# Patient Record
Sex: MALE | Race: WHITE
[De-identification: names, ages, dates, MRNs, and addresses within clinical notes are randomized per-mention and may not be internally consistent; named-entity substitution may affect disease eponyms.]

---

## 2018-04-14 ENCOUNTER — HOSPITAL ENCOUNTER (EMERGENCY)
Dept: HOSPITAL 95 - ER | Age: 28
LOS: 1 days | Discharge: HOME | End: 2018-04-15
Payer: COMMERCIAL

## 2018-04-14 VITALS — HEIGHT: 66 IN | WEIGHT: 164.99 LBS | BODY MASS INDEX: 26.52 KG/M2

## 2018-04-14 DIAGNOSIS — Z91.038: ICD-10-CM

## 2018-04-14 DIAGNOSIS — F17.210: ICD-10-CM

## 2018-04-14 DIAGNOSIS — K59.00: Primary | ICD-10-CM

## 2018-04-14 DIAGNOSIS — Z79.899: ICD-10-CM

## 2018-06-21 ENCOUNTER — HOSPITAL ENCOUNTER (OUTPATIENT)
Dept: HOSPITAL 95 - LAB SHORT | Age: 28
End: 2018-06-21
Attending: PSYCHIATRY & NEUROLOGY
Payer: COMMERCIAL

## 2018-06-21 DIAGNOSIS — Z51.81: Primary | ICD-10-CM

## 2018-06-21 DIAGNOSIS — Z79.899: ICD-10-CM

## 2018-06-21 PROCEDURE — G0480 DRUG TEST DEF 1-7 CLASSES: HCPCS

## 2019-04-01 ENCOUNTER — HOSPITAL ENCOUNTER (EMERGENCY)
Dept: HOSPITAL 95 - ER | Age: 29
Discharge: LEFT BEFORE DISCHARGE | End: 2019-04-01
Payer: COMMERCIAL

## 2019-04-01 VITALS — BODY MASS INDEX: 21.69 KG/M2 | WEIGHT: 134.99 LBS | HEIGHT: 66 IN

## 2019-04-01 DIAGNOSIS — L02.414: Primary | ICD-10-CM

## 2019-04-01 DIAGNOSIS — Z53.20: ICD-10-CM

## 2019-04-02 ENCOUNTER — HOSPITAL ENCOUNTER (EMERGENCY)
Dept: HOSPITAL 95 - ER | Age: 29
Discharge: LEFT BEFORE DISCHARGE | End: 2019-04-02
Payer: COMMERCIAL

## 2019-04-02 VITALS — HEIGHT: 64 IN | BODY MASS INDEX: 28.17 KG/M2 | WEIGHT: 164.99 LBS

## 2019-04-02 DIAGNOSIS — L02.414: Primary | ICD-10-CM

## 2019-04-02 DIAGNOSIS — Z79.899: ICD-10-CM

## 2019-04-02 DIAGNOSIS — Z91.030: ICD-10-CM

## 2019-04-02 DIAGNOSIS — F19.10: ICD-10-CM

## 2019-04-02 DIAGNOSIS — F17.210: ICD-10-CM

## 2019-04-02 LAB
ALBUMIN SERPL BCP-MCNC: 3.2 G/DL (ref 3.4–5)
ALBUMIN/GLOB SERPL: 0.7 {RATIO} (ref 0.8–1.8)
ALT SERPL W P-5'-P-CCNC: 32 U/L (ref 12–78)
ANION GAP SERPL CALCULATED.4IONS-SCNC: 7 MMOL/L (ref 6–16)
AST SERPL W P-5'-P-CCNC: 22 U/L (ref 12–37)
BASOPHILS # BLD AUTO: 0.06 K/MM3 (ref 0–0.23)
BASOPHILS NFR BLD AUTO: 1 % (ref 0–2)
BILIRUB SERPL-MCNC: 0.2 MG/DL (ref 0.1–1)
BUN SERPL-MCNC: 12 MG/DL (ref 8–24)
CALCIUM SERPL-MCNC: 9.3 MG/DL (ref 8.5–10.1)
CHLORIDE SERPL-SCNC: 105 MMOL/L (ref 98–108)
CO2 SERPL-SCNC: 23 MMOL/L (ref 21–32)
CREAT SERPL-MCNC: 0.63 MG/DL (ref 0.6–1.2)
DEPRECATED RDW RBC AUTO: 42.5 FL (ref 35.1–46.3)
EOSINOPHIL # BLD AUTO: 0.03 K/MM3 (ref 0–0.68)
EOSINOPHIL NFR BLD AUTO: 0 % (ref 0–6)
ERYTHROCYTE [DISTWIDTH] IN BLOOD BY AUTOMATED COUNT: 12.8 % (ref 11.7–14.2)
GLOBULIN SER CALC-MCNC: 4.9 G/DL (ref 2.2–4)
GLUCOSE SERPL-MCNC: 105 MG/DL (ref 70–99)
HCT VFR BLD AUTO: 42.1 % (ref 37–53)
HGB BLD-MCNC: 14.1 G/DL (ref 13.5–17.5)
IMM GRANULOCYTES # BLD AUTO: 0.07 K/MM3 (ref 0–0.1)
IMM GRANULOCYTES NFR BLD AUTO: 1 % (ref 0–1)
LYMPHOCYTES # BLD AUTO: 1 K/MM3 (ref 0.84–5.2)
LYMPHOCYTES NFR BLD AUTO: 9 % (ref 21–46)
MCHC RBC AUTO-ENTMCNC: 33.5 G/DL (ref 31.5–36.5)
MCV RBC AUTO: 89 FL (ref 80–100)
MONOCYTES # BLD AUTO: 1.24 K/MM3 (ref 0.16–1.47)
MONOCYTES NFR BLD AUTO: 12 % (ref 4–13)
NEUTROPHILS # BLD AUTO: 8.4 K/MM3 (ref 1.96–9.15)
NEUTROPHILS NFR BLD AUTO: 78 % (ref 41–73)
NRBC # BLD AUTO: 0 K/MM3 (ref 0–0.02)
NRBC BLD AUTO-RTO: 0 /100 WBC (ref 0–0.2)
PLATELET # BLD AUTO: 330 K/MM3 (ref 150–400)
POTASSIUM SERPL-SCNC: 4.5 MMOL/L (ref 3.5–5.5)
PROT SERPL-MCNC: 8.1 G/DL (ref 6.4–8.2)
SODIUM SERPL-SCNC: 135 MMOL/L (ref 136–145)

## 2019-04-14 ENCOUNTER — HOSPITAL ENCOUNTER (EMERGENCY)
Dept: HOSPITAL 95 - ER | Age: 29
Discharge: LEFT BEFORE DISCHARGE | End: 2019-04-14
Payer: COMMERCIAL

## 2019-04-14 ENCOUNTER — HOSPITAL ENCOUNTER (EMERGENCY)
Dept: HOSPITAL 95 - ER | Age: 29
Discharge: HOME | End: 2019-04-14
Payer: COMMERCIAL

## 2019-04-14 VITALS — HEIGHT: 66 IN | WEIGHT: 139.99 LBS | BODY MASS INDEX: 22.5 KG/M2

## 2019-04-14 DIAGNOSIS — L02.511: Primary | ICD-10-CM

## 2019-04-14 DIAGNOSIS — Z91.038: ICD-10-CM

## 2019-04-14 DIAGNOSIS — F17.210: ICD-10-CM

## 2019-04-14 DIAGNOSIS — Z53.21: Primary | ICD-10-CM

## 2019-04-14 DIAGNOSIS — Z51.81: ICD-10-CM

## 2019-04-14 DIAGNOSIS — R05: ICD-10-CM

## 2019-04-14 LAB
ALBUMIN SERPL BCP-MCNC: 3.3 G/DL (ref 3.4–5)
ALBUMIN/GLOB SERPL: 0.8 {RATIO} (ref 0.8–1.8)
ALT SERPL W P-5'-P-CCNC: 15 U/L (ref 12–78)
ANION GAP SERPL CALCULATED.4IONS-SCNC: 5 MMOL/L (ref 6–16)
AST SERPL W P-5'-P-CCNC: 11 U/L (ref 12–37)
BASOPHILS # BLD AUTO: 0.06 K/MM3 (ref 0–0.23)
BASOPHILS NFR BLD AUTO: 0 % (ref 0–2)
BILIRUB SERPL-MCNC: 0.3 MG/DL (ref 0.1–1)
BUN SERPL-MCNC: 9 MG/DL (ref 8–24)
CALCIUM SERPL-MCNC: 8.8 MG/DL (ref 8.5–10.1)
CHLORIDE SERPL-SCNC: 105 MMOL/L (ref 98–108)
CO2 SERPL-SCNC: 29 MMOL/L (ref 21–32)
CREAT SERPL-MCNC: 0.62 MG/DL (ref 0.6–1.2)
DEPRECATED RDW RBC AUTO: 42.3 FL (ref 35.1–46.3)
EOSINOPHIL # BLD AUTO: 0.1 K/MM3 (ref 0–0.68)
EOSINOPHIL NFR BLD AUTO: 1 % (ref 0–6)
ERYTHROCYTE [DISTWIDTH] IN BLOOD BY AUTOMATED COUNT: 13 % (ref 11.7–14.2)
GLOBULIN SER CALC-MCNC: 3.9 G/DL (ref 2.2–4)
GLUCOSE SERPL-MCNC: 101 MG/DL (ref 70–99)
HCT VFR BLD AUTO: 38.4 % (ref 37–53)
HGB BLD-MCNC: 12.8 G/DL (ref 13.5–17.5)
IMM GRANULOCYTES # BLD AUTO: 0.07 K/MM3 (ref 0–0.1)
IMM GRANULOCYTES NFR BLD AUTO: 0 % (ref 0–1)
LYMPHOCYTES # BLD AUTO: 2.08 K/MM3 (ref 0.84–5.2)
LYMPHOCYTES NFR BLD AUTO: 13 % (ref 21–46)
MCHC RBC AUTO-ENTMCNC: 33.3 G/DL (ref 31.5–36.5)
MCV RBC AUTO: 90 FL (ref 80–100)
MONOCYTES # BLD AUTO: 1.06 K/MM3 (ref 0.16–1.47)
MONOCYTES NFR BLD AUTO: 7 % (ref 4–13)
NEUTROPHILS # BLD AUTO: 12.89 K/MM3 (ref 1.96–9.15)
NEUTROPHILS NFR BLD AUTO: 79 % (ref 41–73)
NRBC # BLD AUTO: 0 K/MM3 (ref 0–0.02)
NRBC BLD AUTO-RTO: 0 /100 WBC (ref 0–0.2)
PLATELET # BLD AUTO: 371 K/MM3 (ref 150–400)
POTASSIUM SERPL-SCNC: 3.9 MMOL/L (ref 3.5–5.5)
PROT SERPL-MCNC: 7.2 G/DL (ref 6.4–8.2)
PROTHROMBIN TIME: 9.7 SEC (ref 9.7–11.5)
SODIUM SERPL-SCNC: 139 MMOL/L (ref 136–145)

## 2019-04-17 ENCOUNTER — HOSPITAL ENCOUNTER (EMERGENCY)
Dept: HOSPITAL 95 - ER | Age: 29
Discharge: LEFT BEFORE DISCHARGE | End: 2019-04-17
Payer: COMMERCIAL

## 2019-04-17 DIAGNOSIS — Z53.21: Primary | ICD-10-CM

## 2019-07-05 NOTE — NUR
PT PLEASANT TODAY, ALL EXCEPT FOR FIRST HOUR. AFTER THAT, HAS BEEN FINE. HAS
BEEN OUT TO SMOKE NUMEROUS TIMES. HAS BEEN RESPECTFUL OF BEING HERE AND
CALLING WHEN RETURNS. NO OTHER CONCERNS AT THIS TIME. BED IN
LOW POSITION,C ALL
LITES IN REACH, CALLS APORP

## 2019-07-05 NOTE — NUR
PT SLEEPING SINCE LUNCH. RESP EASY, UNLABORED. DID NOT AWAKEN.
RE METHADONE. CALLED DR ALFARO. OKAY TO MOVE TO PRN QID INSTEAD OF SCHEDULED.
DONE

## 2019-07-05 NOTE — NUR
SHIFT SUMMARY
PT ARRIVED TO ROOM APPROX 2130 OR SO. INDEPENDENT IN ROOM A/O. WENT OUTSIDE A
COUPLE TIMES. NO C/O PAIN IN L ARM. L ARM WOUND DRESSED. HE SLEPT T/O NIGHT.
CALL LIGHT IN REACH.

## 2019-07-05 NOTE — NUR
PT PLEASANT TODAY. STATES PAIN IN L ARM AT I& D SITE. SOME EDEMA NOTED. H/R
REG, NO MURMER NOTED. NO TELE. LUNGS CLEAR, RESP EASY, UNALBORED. ON R.A. BT
X4 LAST BM YEST. VOIDS PER BATHROOM . INDEPENDANT GOES OUT TO SMOKE REGULARLY,
BED IN LOW POSITION, CALL LITE IN REACH, CALLS APPROP

## 2019-07-06 NOTE — NUR
PT IS A/OX3, PLEASANT AND COOPERATIVE, THE PT HAS BEEN UP SEVERAL TIMES TODAY
TO GO OUTSIDE AND SMOKE, THE PT WAS UP INTO THE SHOWER TODAY, THE PT APPEARS
TO BE BREATHING EASILY ON RA, THE PT REMOVED HIS WOUND DRESSING THIS AM, THE
WOUND WAS CLEANED AND THE DRESSING WAS REPLACED USING A CLEAN TECHNIQUE, CALL
LIGHT IN REACH, JORGE L AT THE BEDSIDE AT THIS TIME

## 2019-07-06 NOTE — NUR
SHIFT SUMMARY
PT A/O INDEPENDENT WENT OUTSIDE ONCE. REFUSED DRESSING CHANGE TO L ELBOW. HE
SLEPT T/O NIGHT. NO METHADONE NEEDED. NO C/O PAIN.

## 2019-07-07 NOTE — NUR
SUMMARY
 
PT DISCHARGED, PT VERBALIZED UNDERSTANDING OF DISCHARGE INSTRUCTIONS, PT GIVEN
A FINANCIAL AID APPLICATION FOR THE HOSPITAL, PT ALSO GIVEN A NEW PATIENT
PACKET FOR [facility], PT'S ARM DRESSING CHANGED, PT GIVEN
EXTRA DRESSINGS, PT WAITING FOR HIS RIDE CURRENTLY

## 2019-07-07 NOTE — NUR
SUMMARY: A/O, INDEPENDENT AND SPECIFIES NEEDS. METHADONE PRN RECIEVED FOR
TOLERABLE CONTROL OF L.ARM AND L.LEG PAIN. PT REPORTS BREAKING L.FOOT PRIOR TO
ADMIT AND WEARS BRACE AT TIMES. HE GOES OUTSIDE OFTEN TO SMOKE WA. IV ABX
RECIVED FOR L.AC/ARM CELLULITIS AND DX REMAINS C/D/I. VSS/AFEBRILE, NO ACUTE
CHANGES. LAUNDRY WAS DONE THIS SHIFT PER PT REQUEST AND HE REFUSED AM LABS
AGAIN DESPITE ENCOURAGEMENT, WILL ENSURE DAY STAFF ARE AWARE. WCTM AND REPORT
TO DAY RN.

## 2021-12-13 NOTE — NUR
Discharge instructions reviewed with patient. Patient verbalizes understanding.
Copy given to patient to take home.
Dressing to procedure site clean, dry, intact with no visible
drainage, swelling, erythema or bruising noted.